# Patient Record
Sex: FEMALE | Race: WHITE | NOT HISPANIC OR LATINO | Employment: UNEMPLOYED | URBAN - METROPOLITAN AREA
[De-identification: names, ages, dates, MRNs, and addresses within clinical notes are randomized per-mention and may not be internally consistent; named-entity substitution may affect disease eponyms.]

---

## 2021-03-25 ENCOUNTER — OFFICE VISIT (OUTPATIENT)
Dept: URGENT CARE | Facility: URGENT CARE | Age: 36
End: 2021-03-25
Payer: COMMERCIAL

## 2021-03-25 VITALS
RESPIRATION RATE: 16 BRPM | TEMPERATURE: 98.9 F | DIASTOLIC BLOOD PRESSURE: 67 MMHG | HEART RATE: 75 BPM | SYSTOLIC BLOOD PRESSURE: 120 MMHG | OXYGEN SATURATION: 100 %

## 2021-03-25 DIAGNOSIS — H60.392 INFECTIVE OTITIS EXTERNA, LEFT: ICD-10-CM

## 2021-03-25 DIAGNOSIS — H92.02 LEFT EAR PAIN: Primary | ICD-10-CM

## 2021-03-25 PROCEDURE — 99203 OFFICE O/P NEW LOW 30 MIN: CPT | Performed by: PHYSICIAN ASSISTANT

## 2021-03-25 RX ORDER — CIPROFLOXACIN AND DEXAMETHASONE 3; 1 MG/ML; MG/ML
4 SUSPENSION/ DROPS AURICULAR (OTIC) 2 TIMES DAILY
Qty: 2.8 ML | Refills: 0 | Status: SHIPPED | OUTPATIENT
Start: 2021-03-25 | End: 2021-04-01

## 2021-03-25 RX ORDER — DEXTROAMPHETAMINE SACCHARATE, AMPHETAMINE ASPARTATE MONOHYDRATE, DEXTROAMPHETAMINE SULFATE AND AMPHETAMINE SULFATE 7.5; 7.5; 7.5; 7.5 MG/1; MG/1; MG/1; MG/1
CAPSULE, EXTENDED RELEASE ORAL
COMMUNITY

## 2021-03-25 NOTE — PROGRESS NOTES
Assessment & Plan     Left ear pain  OTC motrin  Secondary to infection    Infective otitis externa, left  ciprodex for infection  Follow up as needed  - ciprofloxacin-dexamethasone (CIPRODEX) 0.3-0.1 % otic suspension; Place 4 drops Into the left ear 2 times daily for 7 days             Tobacco Cessation:   reports that she has been smoking. She has never used smokeless tobacco.          No follow-ups on file.    Jerry Maza PA-C  [facility] Marsh is a 35 year old who presents for the following health issues  accompanied by her spouse:    HPI     Left ear pain  Ear tenderness    Review of Systems   Constitutional, HEENT, cardiovascular, pulmonary, gi and gu systems are negative, except as otherwise noted.      Objective    /67   Pulse 75   Temp 98.9  F (37.2  C) (Tympanic)   Resp 16   SpO2 100%   There is no height or weight on file to calculate BMI.  Physical Exam   GENERAL: healthy, alert and no distress  EYES: Eyes grossly normal to inspection, PERRL and conjunctivae and sclerae normal  HENT: left ear: red and boggy canal, oropharynx clear and oral mucous membranes moist  NECK: no adenopathy, no asymmetry, masses, or scars and thyroid normal to palpation  MS: no gross musculoskeletal defects noted, no edema  SKIN: no suspicious lesions or rashes  NEURO: Normal strength and tone, mentation intact and speech normal  PSYCH: mentation appears normal, affect normal/bright

## 2021-03-25 NOTE — PATIENT INSTRUCTIONS
Patient Education     External Ear Infection (Adult)    External otitis (also called  swimmer s ear ) is an infection in the ear canal. It's often caused by bacteria or fungus. It can occur a few days after water gets trapped in the ear canal (from swimming or bathing). It can also occur after cleaning too deeply in the ear canal with a cotton swab or other object. Sometimes, hair care products get into the ear canal and cause this problem.   Symptoms can include pain, fever, itching, redness, drainage, or swelling of the ear canal. Temporary hearing loss may also occur.   Home care    Don't try to clean the ear canal. This can push pus and bacteria deeper into the canal.    Use prescribed ear drops as directed. These help reduce swelling and fight the infection. If an ear wick was placed in the ear canal, apply drops right onto the end of the wick. The wick will draw the medicine into the ear canal even if it's swollen closed.    A cotton ball may be loosely placed in the outer ear to absorb any drainage.    You may use over-the-counter medicines to control pain as directed by the healthcare provider, unless another medicine was prescribed. Talk with your provider before using these medicines if you have chronic liver or kidney disease or ever had a stomach ulcer or digestive tract bleeding.    Don't allow water to get into your ear when bathing. Also don't swim until the infection has cleared.    Prevention    Keep your ears dry. This helps lower the risk of infection. Dry your ears with a towel or hair dryer after getting wet. Also, use ear plugs when swimming.    Don't stick any objects in the ear to remove wax.    Talk with your provider about using ear drops to prevent swimmer's ear in case you feel water trapped in your ear canal. You can get these drops over the counter at most drugstores. They work by removing water from the ear canal.    Follow-up care  Follow up with your healthcare provider in 1 week,  or as advised.   When to seek medical advice  Call your healthcare provider right away if any of these occur:     Ear pain becomes worse or doesn t improve after 3 days of treatment    Redness or swelling of the outer ear occurs or gets worse    Headache    Fever of 100.4 F (38 C) or higher, or as directed by your healthcare provider  Call 911  Call 911 or get immediate medical care if any of the following occur:     Seizure    Unusual drowsiness or confusion    Unusual painful or stiff neck    Ailyn last reviewed this educational content on 8/1/2020 2000-2020 [employer]. All rights reserved. This information is not intended as a substitute for professional medical care. Always follow your healthcare professional's instructions.

## 2021-05-03 ENCOUNTER — ANCILLARY PROCEDURE (OUTPATIENT)
Dept: GENERAL RADIOLOGY | Facility: CLINIC | Age: 36
End: 2021-05-03
Attending: PHYSICIAN ASSISTANT
Payer: COMMERCIAL

## 2021-05-03 ENCOUNTER — OFFICE VISIT (OUTPATIENT)
Dept: URGENT CARE | Facility: URGENT CARE | Age: 36
End: 2021-05-03
Payer: COMMERCIAL

## 2021-05-03 VITALS
HEART RATE: 79 BPM | OXYGEN SATURATION: 99 % | TEMPERATURE: 98.9 F | DIASTOLIC BLOOD PRESSURE: 72 MMHG | WEIGHT: 160 LBS | RESPIRATION RATE: 20 BRPM | SYSTOLIC BLOOD PRESSURE: 118 MMHG

## 2021-05-03 DIAGNOSIS — M79.645 PAIN OF FINGER OF LEFT HAND: ICD-10-CM

## 2021-05-03 DIAGNOSIS — L03.012 FELON OF FINGER OF LEFT HAND: ICD-10-CM

## 2021-05-03 DIAGNOSIS — M79.645 PAIN OF FINGER OF LEFT HAND: Primary | ICD-10-CM

## 2021-05-03 LAB
BASOPHILS # BLD AUTO: 0 10E9/L (ref 0–0.2)
BASOPHILS NFR BLD AUTO: 0.1 %
DIFFERENTIAL METHOD BLD: NORMAL
EOSINOPHIL # BLD AUTO: 0.1 10E9/L (ref 0–0.7)
EOSINOPHIL NFR BLD AUTO: 1.6 %
ERYTHROCYTE [DISTWIDTH] IN BLOOD BY AUTOMATED COUNT: 13.3 % (ref 10–15)
HCT VFR BLD AUTO: 39.9 % (ref 35–47)
HGB BLD-MCNC: 12.9 G/DL (ref 11.7–15.7)
LYMPHOCYTES # BLD AUTO: 1.6 10E9/L (ref 0.8–5.3)
LYMPHOCYTES NFR BLD AUTO: 19.9 %
MCH RBC QN AUTO: 30.9 PG (ref 26.5–33)
MCHC RBC AUTO-ENTMCNC: 32.3 G/DL (ref 31.5–36.5)
MCV RBC AUTO: 96 FL (ref 78–100)
MONOCYTES # BLD AUTO: 0.6 10E9/L (ref 0–1.3)
MONOCYTES NFR BLD AUTO: 7.3 %
NEUTROPHILS # BLD AUTO: 5.7 10E9/L (ref 1.6–8.3)
NEUTROPHILS NFR BLD AUTO: 71.1 %
PLATELET # BLD AUTO: 331 10E9/L (ref 150–450)
RBC # BLD AUTO: 4.18 10E12/L (ref 3.8–5.2)
WBC # BLD AUTO: 8 10E9/L (ref 4–11)

## 2021-05-03 PROCEDURE — 36415 COLL VENOUS BLD VENIPUNCTURE: CPT | Performed by: PHYSICIAN ASSISTANT

## 2021-05-03 PROCEDURE — 85025 COMPLETE CBC W/AUTO DIFF WBC: CPT | Performed by: PHYSICIAN ASSISTANT

## 2021-05-03 PROCEDURE — 73140 X-RAY EXAM OF FINGER(S): CPT | Mod: LT | Performed by: RADIOLOGY

## 2021-05-03 PROCEDURE — 99214 OFFICE O/P EST MOD 30 MIN: CPT | Performed by: PHYSICIAN ASSISTANT

## 2021-05-03 RX ORDER — CLONIDINE HYDROCHLORIDE 0.1 MG/1
TABLET ORAL
COMMUNITY
Start: 2021-03-31

## 2021-05-03 RX ORDER — VORTIOXETINE 20 MG/1
TABLET, FILM COATED ORAL
COMMUNITY
Start: 2021-04-05

## 2021-05-03 NOTE — PROGRESS NOTES
Assessment & Plan     Pain of finger of left hand  CBC negative for significant infection  Xray Negative for acute findings, read by Jerry Maza PA-C [facility] at time of visit.    - CBC with platelets differential  - XR Finger Left G/E 2 Views; Future    Felon of finger of left hand  Area of infection in pad of finger  Warm finger soaks  Motrin  Augmenting  Referral to orthopedics  - CBC with platelets differential  - XR Finger Left G/E 2 Views; Future  - amoxicillin-clavulanate (AUGMENTIN) 875-125 MG tablet; Take 1 tablet by mouth 2 times daily for 10 days  - Orthopedic & Spine  Referral; Future     Tobacco Cessation:   reports that she has been smoking. She has never used smokeless tobacco.      CONSULTATION/REFERRAL to orthopedics      Jerry Maza PA-C  [facility] Marsh is a 36 year old who presents for the following health issues  accompanied by her friend:    HPI     Left finger infection, swelling  Left finger tenderness    Review of Systems   Constitutional, HEENT, cardiovascular, pulmonary, gi and gu systems are negative, except as otherwise noted.      Objective    /72   Pulse 79   Temp 98.9  F (37.2  C)   Resp 20   Wt 72.6 kg (160 lb)   LMP  (LMP Unknown)   SpO2 99%   There is no height or weight on file to calculate BMI.  Physical Exam   GENERAL: healthy, alert and no distress  MS: Positive for pad of finger swelling, tenderness, erythema  SKIN: Positive for felon appearing infection  NEURO: Normal strength and tone, mentation intact and speech normal  PSYCH: mentation appears normal, affect normal/bright  LYMPH: no cervical, supraclavicular, axillary, or inguinal adenopathy    Results for orders placed or performed in visit on 05/03/21   XR Finger Left G/E 2 Views     Status: None    Narrative    XR FINGER LEFT G/E 2 VIEWS 5/3/2021 1:13 PM     HISTORY: Pain of finger of left hand; Felon of finger of left hand      Impression     IMPRESSION: No radiopaque foreign body. No evidence of fracture or  osseous destruction.    AL ROLDAN MD   Results for orders placed or performed in visit on 05/03/21   CBC with platelets differential     Status: None   Result Value Ref Range    WBC 8.0 4.0 - 11.0 10e9/L    RBC Count 4.18 3.8 - 5.2 10e12/L    Hemoglobin 12.9 11.7 - 15.7 g/dL    Hematocrit 39.9 35.0 - 47.0 %    MCV 96 78 - 100 fl    MCH 30.9 26.5 - 33.0 pg    MCHC 32.3 31.5 - 36.5 g/dL    RDW 13.3 10.0 - 15.0 %    Platelet Count 331 150 - 450 10e9/L    % Neutrophils 71.1 %    % Lymphocytes 19.9 %    % Monocytes 7.3 %    % Eosinophils 1.6 %    % Basophils 0.1 %    Absolute Neutrophil 5.7 1.6 - 8.3 10e9/L    Absolute Lymphocytes 1.6 0.8 - 5.3 10e9/L    Absolute Monocytes 0.6 0.0 - 1.3 10e9/L    Absolute Eosinophils 0.1 0.0 - 0.7 10e9/L    Absolute Basophils 0.0 0.0 - 0.2 10e9/L    Diff Method Automated Method

## 2021-05-06 ENCOUNTER — OFFICE VISIT (OUTPATIENT)
Dept: ORTHOPEDICS | Facility: CLINIC | Age: 36
End: 2021-05-06
Payer: COMMERCIAL

## 2021-05-06 VITALS
HEIGHT: 66 IN | WEIGHT: 160 LBS | BODY MASS INDEX: 25.71 KG/M2 | SYSTOLIC BLOOD PRESSURE: 120 MMHG | DIASTOLIC BLOOD PRESSURE: 68 MMHG

## 2021-05-06 DIAGNOSIS — L03.012 FELON OF FINGER OF LEFT HAND: ICD-10-CM

## 2021-05-06 PROCEDURE — 99203 OFFICE O/P NEW LOW 30 MIN: CPT | Performed by: ORTHOPAEDIC SURGERY

## 2021-05-06 ASSESSMENT — MIFFLIN-ST. JEOR: SCORE: 1427.51

## 2021-05-06 NOTE — PROGRESS NOTES
HISTORY OF PRESENT ILLNESS:    Salma Salcedo is a 36 year old female who is seen in consultation at the request of Dr. Maza, Urgent Care follow up for infection of left long finger.  Onset of symptoms x1 week ago. Pain radiating into the left arm.  She is not currently working, right hand dominant.     Present symptoms: left distal long finger pain, redness, and swelling. Tenderness and radiating pain improved with starting of Augmentin. No drainage. Patient reports previous medication side effects of blisters on her fingers about 1 month ago, blisters resolved with discontinuation of medication.   Treatments tried to this point: antibiotic.   Orthopedic PMH: none.     History reviewed. No pertinent past medical history.   History of attention deficit/hyperactivity disorder  History of UTI    History reviewed. No pertinent surgical history.  none        History reviewed. No pertinent family history.    Social History     Socioeconomic History     Marital status: Single     Spouse name: Not on file     Number of children: Not on file     Years of education: Not on file     Highest education level: Not on file   Occupational History     Not on file   Social Needs     Financial resource strain: Not on file     Food insecurity     Worry: Not on file     Inability: Not on file     Transportation needs     Medical: Not on file     Non-medical: Not on file   Tobacco Use     Smoking status: Current Every Day Smoker     Smokeless tobacco: Never Used   Substance and Sexual Activity     Alcohol use: Not Currently     Drug use: Never     Sexual activity: Not Currently   Lifestyle     Physical activity     Days per week: Not on file     Minutes per session: Not on file     Stress: Not on file   Relationships     Social connections     Talks on phone: Not on file     Gets together: Not on file     Attends Christianity service: Not on file     Active member of club or organization: Not on file     Attends meetings of clubs or  "organizations: Not on file     Relationship status: Not on file     Intimate partner violence     Fear of current or ex partner: Not on file     Emotionally abused: Not on file     Physically abused: Not on file     Forced sexual activity: Not on file   Other Topics Concern     Not on file   Social History Narrative     Not on file       Current Outpatient Medications   Medication Sig Dispense Refill     amoxicillin-clavulanate (AUGMENTIN) 875-125 MG tablet Take 1 tablet by mouth 2 times daily for 10 days 20 tablet 0     amphetamine-dextroamphetamine (ADDERALL XR) 30 MG 24 hr capsule        cloNIDine (CATAPRES) 0.1 MG tablet        TRINTELLIX 20 MG tablet          No Known Allergies    REVIEW OF SYSTEMS:  CONSTITUTIONAL:  NEGATIVE for fever, chills, change in weight  INTEGUMENTARY/SKIN:  NEGATIVE for worrisome rashes, moles or lesions  EYES:  NEGATIVE for vision changes or irritation  ENT/MOUTH:  NEGATIVE for ear, mouth and throat problems  RESP:  NEGATIVE for significant cough or SOB  BREAST:  NEGATIVE for masses, tenderness or discharge  CV:  NEGATIVE for chest pain, palpitations or peripheral edema  GI:  NEGATIVE for nausea, abdominal pain, heartburn, or change in bowel habits  :  Negative   MUSCULOSKELETAL:  See HPI above  NEURO:  NEGATIVE for weakness, dizziness or paresthesias  ENDOCRINE:  NEGATIVE for temperature intolerance, skin/hair changes  HEME/ALLERGY/IMMUNE:  NEGATIVE for bleeding problems  PSYCHIATRIC:  Anxiety, depression      PHYSICAL EXAM:  /68 (BP Location: Right arm)   Ht 1.676 m (5' 6\")   Wt 72.6 kg (160 lb)   LMP  (LMP Unknown)   BMI 25.82 kg/m    Body mass index is 25.82 kg/m .   GENERAL APPEARANCE: healthy, alert and no distress   HEENT: No apparent thyroid megaly. Clear sclera with normal ocular movement  RESPIRATORY: No labored breathing  SKIN: no suspicious lesions or rashes  NEURO: Normal strength and tone, mentation intact and speech normal  VASCULAR: Good pulses, and " capillary refill   LYMPH: no lymphadenopathy   PSYCH:  mentation appears normal and affect normal/bright    MUSCULOSKELETAL:  Not in acute distress  Normal gait    Left hand without swelling  However, slight swelling is noted at the fingertip of the long finger  There is couple of small skin lesions on the ulnar side of the distal phalanx  The nail itself is intact  Minimal erythema noted  Active range of motion of the finger joints is well-maintained  No lymphangitis  Circulation is intact  Sensation is intact     ASSESSMENT:  Left long finger infection, appears to be well resolving with Augmentin    PLAN:  Regardless of the etiology of the infection of the fingertip, she is making a very good progress with Augmentin.  She has finished about 4 days of antibiotics so far.  Other than continuation and finishing the antibiotics, no further intervention was felt to be necessary.  However, if her situation worsens with accumulation of fluid such as abscess, she should return to clinic immediately.    Otherwise, follow-up as needed as long as she continues to improve.  .    Imaging Interpretation:   None taken today    Abdulaziz Murray MD  Department of Orthopedic Surgery        Disclaimer: This note consists of symbols derived from keyboarding, dictation and/or voice recognition software. As a result, there may be errors in the script that have gone undetected. Please consider this when interpreting information found in this chart.

## 2021-05-06 NOTE — LETTER
5/6/2021         RE: Salma Salcedo  [address]  [place] [address]        Dear Colleague,    Thank you for referring your patient, Salma Salcedo, to the [facility] [place]. Please see a copy of my visit note below.    HISTORY OF PRESENT ILLNESS:    Salma Salcedo is a 36 year old female who is seen in consultation at the request of Dr. Maza, Urgent Care follow up for infection of left long finger.  Onset of symptoms x1 week ago. Pain radiating into the left arm.  She is not currently working, right hand dominant.     Present symptoms: left distal long finger pain, redness, and swelling. Tenderness and radiating pain improved with starting of Augmentin. No drainage. Patient reports previous medication side effects of blisters on her fingers about 1 month ago, blisters resolved with discontinuation of medication.   Treatments tried to this point: antibiotic.   Orthopedic PMH: none.     History reviewed. No pertinent past medical history.   History of attention deficit/hyperactivity disorder  History of UTI    History reviewed. No pertinent surgical history.  none        History reviewed. No pertinent family history.    Social History     Socioeconomic History     Marital status: Single     Spouse name: Not on file     Number of children: Not on file     Years of education: Not on file     Highest education level: Not on file   Occupational History     Not on file   Social Needs     Financial resource strain: Not on file     Food insecurity     Worry: Not on file     Inability: Not on file     Transportation needs     Medical: Not on file     Non-medical: Not on file   Tobacco Use     Smoking status: Current Every Day Smoker     Smokeless tobacco: Never Used   Substance and Sexual Activity     Alcohol use: Not Currently     Drug use: Never     Sexual activity: Not Currently   Lifestyle     Physical activity     Days per week: Not on file     Minutes per session: Not  "on file     Stress: Not on file   Relationships     Social connections     Talks on phone: Not on file     Gets together: Not on file     Attends Holiness service: Not on file     Active member of club or organization: Not on file     Attends meetings of clubs or organizations: Not on file     Relationship status: Not on file     Intimate partner violence     Fear of current or ex partner: Not on file     Emotionally abused: Not on file     Physically abused: Not on file     Forced sexual activity: Not on file   Other Topics Concern     Not on file   Social History Narrative     Not on file       Current Outpatient Medications   Medication Sig Dispense Refill     amoxicillin-clavulanate (AUGMENTIN) 875-125 MG tablet Take 1 tablet by mouth 2 times daily for 10 days 20 tablet 0     amphetamine-dextroamphetamine (ADDERALL XR) 30 MG 24 hr capsule        cloNIDine (CATAPRES) 0.1 MG tablet        TRINTELLIX 20 MG tablet          No Known Allergies    REVIEW OF SYSTEMS:  CONSTITUTIONAL:  NEGATIVE for fever, chills, change in weight  INTEGUMENTARY/SKIN:  NEGATIVE for worrisome rashes, moles or lesions  EYES:  NEGATIVE for vision changes or irritation  ENT/MOUTH:  NEGATIVE for ear, mouth and throat problems  RESP:  NEGATIVE for significant cough or SOB  BREAST:  NEGATIVE for masses, tenderness or discharge  CV:  NEGATIVE for chest pain, palpitations or peripheral edema  GI:  NEGATIVE for nausea, abdominal pain, heartburn, or change in bowel habits  :  Negative   MUSCULOSKELETAL:  See HPI above  NEURO:  NEGATIVE for weakness, dizziness or paresthesias  ENDOCRINE:  NEGATIVE for temperature intolerance, skin/hair changes  HEME/ALLERGY/IMMUNE:  NEGATIVE for bleeding problems  PSYCHIATRIC:  Anxiety, depression      PHYSICAL EXAM:  /68 (BP Location: Right arm)   Ht 1.676 m (5' 6\")   Wt 72.6 kg (160 lb)   LMP  (LMP Unknown)   BMI 25.82 kg/m    Body mass index is 25.82 kg/m .   GENERAL APPEARANCE: healthy, alert and no " distress   HEENT: No apparent thyroid megaly. Clear sclera with normal ocular movement  RESPIRATORY: No labored breathing  SKIN: no suspicious lesions or rashes  NEURO: Normal strength and tone, mentation intact and speech normal  VASCULAR: Good pulses, and capillary refill   LYMPH: no lymphadenopathy   PSYCH:  mentation appears normal and affect normal/bright    MUSCULOSKELETAL:  Not in acute distress  Normal gait    Left hand without swelling  However, slight swelling is noted at the fingertip of the long finger  There is couple of small skin lesions on the ulnar side of the distal phalanx  The nail itself is intact  Minimal erythema noted  Active range of motion of the finger joints is well-maintained  No lymphangitis  Circulation is intact  Sensation is intact     ASSESSMENT:  Left long finger infection, appears to be well resolving with Augmentin    PLAN:  Regardless of the etiology of the infection of the fingertip, she is making a very good progress with Augmentin.  She has finished about 4 days of antibiotics so far.  Other than continuation and finishing the antibiotics, no further intervention was felt to be necessary.  However, if her situation worsens with accumulation of fluid such as abscess, she should return to clinic immediately.    Otherwise, follow-up as needed as long as she continues to improve.  .    Imaging Interpretation:   None taken today    Abdulaziz Murray MD  Department of Orthopedic Surgery        Disclaimer: This note consists of symbols derived from keyboarding, dictation and/or voice recognition software. As a result, there may be errors in the script that have gone undetected. Please consider this when interpreting information found in this chart.        Again, thank you for allowing me to participate in the care of your patient.        Sincerely,        Abdulaziz Murray MD

## 2021-05-06 NOTE — PATIENT INSTRUCTIONS
Finish off the antibiotics  With any worsening especially with a streaking redness in the arm or increased pain in the fingertip, return to clinic as soon as possible.

## 2022-09-06 ENCOUNTER — OFFICE VISIT (OUTPATIENT)
Dept: URGENT CARE | Facility: URGENT CARE | Age: 37
End: 2022-09-06
Payer: COMMERCIAL

## 2022-09-06 VITALS
TEMPERATURE: 98.8 F | DIASTOLIC BLOOD PRESSURE: 91 MMHG | OXYGEN SATURATION: 100 % | HEART RATE: 72 BPM | SYSTOLIC BLOOD PRESSURE: 132 MMHG | RESPIRATION RATE: 20 BRPM

## 2022-09-06 DIAGNOSIS — H00.015 HORDEOLUM EXTERNUM OF LEFT LOWER EYELID: Primary | ICD-10-CM

## 2022-09-06 PROCEDURE — 99213 OFFICE O/P EST LOW 20 MIN: CPT | Performed by: FAMILY MEDICINE

## 2022-09-06 RX ORDER — ERYTHROMYCIN 5 MG/G
0.5 OINTMENT OPHTHALMIC 3 TIMES DAILY
Qty: 10.5 G | Refills: 0 | Status: SHIPPED | OUTPATIENT
Start: 2022-09-06 | End: 2022-09-13

## 2022-09-06 NOTE — PROGRESS NOTES
EPICSPMNEYESUBJECTIVE:Chief Complaint:   Chief Complaint   Patient presents with     Eye Problem     Left eye cyst/stye? for 1 week      History of Present Illness: Salma Salcedo is a 37 year old female who presents complaining of tender bump on eyelid for few days.  Onset/timing: gradual.   Associated Signs and Symptoms: discomfort   Treatment measures tried include: none   Contact wearer : No    No past medical history on file.  No Known Allergies  Social History     Tobacco Use     Smoking status: Current Every Day Smoker     Smokeless tobacco: Never Used   Substance Use Topics     Alcohol use: Not Currently       ROS:  no fevers  no photophobia, vision change  SKIN: no eythema    OBJECTIVE:  BP (!) 132/91   Pulse 72   Temp 98.8  F (37.1  C)   Resp 20   LMP  (LMP Unknown)   SpO2 100%    General: no acute distress  Eye exam: tender enlarged lump eyelid.  PERRLA, no photophobia, conjunctiva clear      ICD-10-CM    1. Hordeolum externum of left lower eyelid  H00.015 erythromycin (ROMYCIN) 5 MG/GM ophthalmic ointment     Adult Eye  Referral       warms packs, f/u Opthalmology if persists as some sty's need I&D.

## 2022-09-14 ENCOUNTER — TELEPHONE (OUTPATIENT)
Dept: OPHTHALMOLOGY | Facility: CLINIC | Age: 37
End: 2022-09-14

## 2022-09-14 NOTE — TELEPHONE ENCOUNTER
Spoke with patient regarding referral and scheduling for Hordeolum externum of left lower eyelid-Per Gene Gardner DO . Patient declined scheduling as she is doing better. Informed patient to call and schedule in [facility] as it might be closer with any new or worsening concerns. -Per Patient

## 2023-04-24 ENCOUNTER — OFFICE VISIT (OUTPATIENT)
Dept: URGENT CARE | Facility: URGENT CARE | Age: 38
End: 2023-04-24
Payer: COMMERCIAL

## 2023-04-24 VITALS
OXYGEN SATURATION: 100 % | TEMPERATURE: 98.5 F | RESPIRATION RATE: 18 BRPM | HEART RATE: 77 BPM | DIASTOLIC BLOOD PRESSURE: 92 MMHG | SYSTOLIC BLOOD PRESSURE: 144 MMHG

## 2023-04-24 DIAGNOSIS — K04.7 DENTAL INFECTION: Primary | ICD-10-CM

## 2023-04-24 PROCEDURE — 99214 OFFICE O/P EST MOD 30 MIN: CPT | Performed by: FAMILY MEDICINE

## 2023-04-24 RX ORDER — ATOMOXETINE 40 MG/1
CAPSULE ORAL
COMMUNITY
Start: 2023-02-23

## 2023-04-24 RX ORDER — HYDROCODONE BITARTRATE AND ACETAMINOPHEN 5; 325 MG/1; MG/1
1 TABLET ORAL EVERY 6 HOURS PRN
Qty: 8 TABLET | Refills: 0 | Status: SHIPPED | OUTPATIENT
Start: 2023-04-24 | End: 2023-04-26

## 2023-04-24 RX ORDER — BREXPIPRAZOLE 1 MG/1
TABLET ORAL
COMMUNITY
Start: 2022-06-27

## 2023-04-24 RX ORDER — TRAZODONE HYDROCHLORIDE 100 MG/1
TABLET ORAL
COMMUNITY
Start: 2023-04-04

## 2023-04-24 NOTE — PROGRESS NOTES
SUBJECTIVE: Salma Salcedo is a 38 year old female presenting with a chief complaint of rt upper tooth pain.  Onset of symptoms was day(s) ago.  Course of illness is worsening.    Severity moderate  Predisposing factors include dental carries.    No past medical history on file.  No Known Allergies  Social History     Tobacco Use     Smoking status: Every Day     Smokeless tobacco: Never   Vaping Use     Vaping status: Not on file   Substance Use Topics     Alcohol use: Not Currently       ROS:  SKIN: no rash  GI: no vomiting    OBJECTIVE:  BP (!) 144/92   Pulse 77   Temp 98.5  F (36.9  C) (Tympanic)   Resp 18   SpO2 100%    GENERAL APPEARANCE: healthy, alert and no distress  EYES: EOMI,  PERRL, conjunctiva clear  HENT: throat clear  SKIN: no suspicious lesions or rashes      ICD-10-CM    1. Dental infection  K04.7 amoxicillin-clavulanate (AUGMENTIN) 875-125 MG tablet     HYDROcodone-acetaminophen (NORCO) 5-325 MG tablet        F/u dental  Fluids/Rest, f/u if worse/not any better